# Patient Record
Sex: MALE | Race: WHITE | Employment: OTHER | URBAN - METROPOLITAN AREA
[De-identification: names, ages, dates, MRNs, and addresses within clinical notes are randomized per-mention and may not be internally consistent; named-entity substitution may affect disease eponyms.]

---

## 2017-12-22 ENCOUNTER — HOSPITAL ENCOUNTER (OUTPATIENT)
Dept: PHYSICAL THERAPY | Age: 82
Discharge: HOME | End: 2017-12-22
Attending: FAMILY MEDICINE
Payer: MEDICARE

## 2017-12-22 ENCOUNTER — HOSPITAL ENCOUNTER (OUTPATIENT)
Dept: GENERAL RADIOLOGY | Age: 82
Discharge: HOME | End: 2017-12-22
Attending: FAMILY MEDICINE
Payer: MEDICARE

## 2017-12-22 DIAGNOSIS — R13.11 DYSPHAGIA, ORAL PHASE: ICD-10-CM

## 2017-12-22 PROCEDURE — 92611 MOTION FLUOROSCOPY/SWALLOW: CPT

## 2017-12-22 PROCEDURE — G8998 SWALLOW D/C STATUS: HCPCS

## 2017-12-22 PROCEDURE — 74230 X-RAY XM SWLNG FUNCJ C+: CPT

## 2017-12-22 PROCEDURE — G8997 SWALLOW GOAL STATUS: HCPCS

## 2017-12-22 PROCEDURE — G8996 SWALLOW CURRENT STATUS: HCPCS

## 2017-12-22 PROCEDURE — 74011000255 HC RX REV CODE- 255

## 2017-12-22 RX ADMIN — BARIUM SULFATE 80 G: 960 POWDER, FOR SUSPENSION ORAL at 12:37

## 2017-12-22 RX ADMIN — BARIUM SULFATE 20 ML: 400 SUSPENSION ORAL at 12:36

## 2017-12-22 RX ADMIN — BARIUM SULFATE 10 ML: 400 SUSPENSION ORAL at 12:36

## 2017-12-22 RX ADMIN — BARIUM SULFATE 10 ML: 400 PASTE ORAL at 12:37

## 2017-12-22 NOTE — PROGRESS NOTES
[facility] at [address] [place] [address]  Phone: [phone]  Fax: [phone]    Outpatient Modified Barium Swallow Evaluation  Patient: Nicole Bermudez (60 y.o. male)  Date: 12/22/2017  Primary Diagnosis: R13.11, dysphagia oral phase  Precautions: Aspiration. Video Flouroscopic Procedures  [x] Lateral View   [] A-P View [x] Scanned to level of Sternum    [x] Seated at 90 deg.   [] Other:    Presentation:   [x] Spoon   [x] Cup   [x] Straw   [] Syringe   [] Consecutive Swallows  [] Other:    Consistencies:   [x] Ba+ liquid   [x] Ba+ liquid (nectar)   [x] Ba+ liquid (honey)     [x] Ba+ pudding   [] Ba+ crunched cookie   [] Ba+ cookie   [] Other:    Testing Discontinued: [x] Due to: Minimal to no clearance of viscous trials  Treatment Techniques Attempted    [x] Head Turn: [x] Right [x] Left     [] Head Tilt: [] Right [] Left     [x] Chin Down:  [x] Effortful swallow:  [] Supraglottic Swallow:  [] Mendelson's Maneuver:  [x] Other: small bites/sips, cup/sips; straw sips    Results  Dysphagia Present:    [x] Yes  [] No    Ratings of Dysphagia:    [x] Mild Oral  [x] Severe Pharyngeal    Stages of Breakdown:   [] Oral Preparatory [x] Oral  [x] Pharyngeal   [] Esophageal    Aspiration: [x] Yes    [] No  [x] At Risk  [] Trace (<10%) [x] Significant (>10%): >50 %  [x] Penetration: Level of Vocal Cords  Cough: [x] Yes      [] No    Consistency Aspirated:  [x] Thin Liquid  [x] Thick Liquid   [x] Pureed [] Semi-Solid   [] Solid    Motility Problems with:  [] Lip Closure:   [] Sucking:   [] Mastication:   [x] Bolus Formation:    [x] Bolus Control:  [x] A-P Transport:  [x] Posterior Tongue Elevation:  [x] Swallow Response (delayed):  [x] Velopharyngeal Closure:  [x] Pharyngeal Aspirations:  [x] Laryngeal Elevation:  [x] Laryngeal Adduction:  [] Cricopharyngeal Relaxation:  [] Esophageal Peristalsis:  [] Reflux:  [] Other:    Timing of Aspiration or Penetration:  [] Before Swallow:  [x] During Swallow:  [x] After Swallow:    Transit Time Delay:  [x] >1 Second  Oral  [x] >1 Second Pharyngeal  [] >20 Second Esophageal     Pharyngeal Residue:  [] Trace Residue  [] Majority of contrast remaining    [x] Minimal to no pharyngeal clearance     Summary:   Mild oral and severe pharyngeal dysphagia. Patient presented with consistent aspiration and penetration to the level of the cords during and after the swallow across trials consistent incomplete epiglottic inversion, poor airway protection, reduced laryngeal elevation, and diminished anterior excursion of the hyoid. Presents with immediate and delayed cough during episodes of aspiration. Pharyngeal stasis >50% remained with poor ability to clear when cued for the aforementioned compensatory strategies above. This patient is high risk for aspiration pneumonia. 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 Primary speech pathologist notes history of osteophytes, and this was visualized at the level of C3 and C4 during the test. These did not appear to impact the swallow. In fact, SLP suspects the patient's swallow dysfunction are more related to CN X impairment as the patient's voice is hoarse, wet, and low. Recommendations:   NPO with considerations for alternative means of nutrition at this time, if the patient's wishes are for heroic/aggressive measures. Risks and benefits from alternative means of nutrition discusses, however reinforcement recommended. Recommend GI, Neuro, ENT, Palliative care, and speech therapy services for further evaluation of the patient's oropharyngeal dysphagia.      Predictors of Aspiration Pneumonia (Sandy et al., 1998, 2002)   [] Feeder   [] modified diet  [x]weight loss   [] TF  [] AMS   [] suctioning  [x] dysphagia [] COPD  [] bed-bound   [x] CHF   [x] advanced age [] # of decayed teeth  []smoker [x] # of medications.     Functional Oral Intake Scale  [x] 1: Nothing by mouth     [] 2: Tube dependent with minimal attempts of food or liquid  [] 3: Tube dependent with consistent oral intake of food or liquid  [] 4: Total oral diet of a single consistency  [] 5: Total oral diet with multiple consistencies but requiring special preparation or compensations   [] 6: Total oral diet with multiple consistencies without special preparation, but with specific food limitations  [] 7: Total oral diet with no restrictions  (Dominique Maravilla, 2005)    GCODESwallowing:  Swallow Current Status CM= 80-99%   Swallow Goal Status CM= 80-99%   Swallow D/C Status CM= 80-99%    The severity rating is based on the following outcomes:    National Outcomes Measures (NOMS) - Swallow Level BRIANDA Noms Level 2  8-point Penetration-Aspiration Scale Score 8  Professional Judgement    Thank you for this referral,   SISSY TamayoSZack., CCC/SLP  Speech Language Pathologist